# Patient Record
Sex: MALE | Race: OTHER | HISPANIC OR LATINO | Employment: FULL TIME | URBAN - METROPOLITAN AREA
[De-identification: names, ages, dates, MRNs, and addresses within clinical notes are randomized per-mention and may not be internally consistent; named-entity substitution may affect disease eponyms.]

---

## 2022-07-10 ENCOUNTER — APPOINTMENT (EMERGENCY)
Dept: RADIOLOGY | Facility: HOSPITAL | Age: 37
End: 2022-07-10
Payer: COMMERCIAL

## 2022-07-10 ENCOUNTER — HOSPITAL ENCOUNTER (EMERGENCY)
Facility: HOSPITAL | Age: 37
Discharge: HOME | End: 2022-07-11
Attending: EMERGENCY MEDICINE | Admitting: EMERGENCY MEDICINE
Payer: COMMERCIAL

## 2022-07-10 DIAGNOSIS — S52.601A CLOSED FRACTURE OF DISTAL ENDS OF RIGHT RADIUS AND ULNA, INITIAL ENCOUNTER: Primary | ICD-10-CM

## 2022-07-10 DIAGNOSIS — S52.501A CLOSED FRACTURE OF DISTAL ENDS OF RIGHT RADIUS AND ULNA, INITIAL ENCOUNTER: Primary | ICD-10-CM

## 2022-07-10 PROCEDURE — 99285 EMERGENCY DEPT VISIT HI MDM: CPT | Performed by: EMERGENCY MEDICINE

## 2022-07-10 PROCEDURE — 73090 X-RAY EXAM OF FOREARM: CPT

## 2022-07-10 PROCEDURE — 73110 X-RAY EXAM OF WRIST: CPT

## 2022-07-10 PROCEDURE — 99284 EMERGENCY DEPT VISIT MOD MDM: CPT

## 2022-07-10 PROCEDURE — 25605 CLTX DST RDL FX/EPHYS SEP W/: CPT | Performed by: EMERGENCY MEDICINE

## 2022-07-10 RX ORDER — OXYCODONE HYDROCHLORIDE 5 MG/1
5 TABLET ORAL ONCE
Status: COMPLETED | OUTPATIENT
Start: 2022-07-10 | End: 2022-07-10

## 2022-07-10 RX ORDER — LIDOCAINE HYDROCHLORIDE 10 MG/ML
10 INJECTION, SOLUTION EPIDURAL; INFILTRATION; INTRACAUDAL; PERINEURAL ONCE
Status: COMPLETED | OUTPATIENT
Start: 2022-07-10 | End: 2022-07-10

## 2022-07-10 RX ORDER — ACETAMINOPHEN 325 MG/1
975 TABLET ORAL ONCE
Status: COMPLETED | OUTPATIENT
Start: 2022-07-10 | End: 2022-07-10

## 2022-07-10 RX ORDER — ONDANSETRON 4 MG/1
4 TABLET, ORALLY DISINTEGRATING ORAL ONCE
Status: COMPLETED | OUTPATIENT
Start: 2022-07-10 | End: 2022-07-10

## 2022-07-10 RX ADMIN — OXYCODONE HYDROCHLORIDE 5 MG: 5 TABLET ORAL at 22:16

## 2022-07-10 RX ADMIN — LIDOCAINE HYDROCHLORIDE 10 ML: 10 INJECTION, SOLUTION EPIDURAL; INFILTRATION; INTRACAUDAL at 22:56

## 2022-07-10 RX ADMIN — ACETAMINOPHEN 975 MG: 325 TABLET, FILM COATED ORAL at 22:16

## 2022-07-10 RX ADMIN — ONDANSETRON 4 MG: 4 TABLET, ORALLY DISINTEGRATING ORAL at 23:33

## 2022-07-10 NOTE — Clinical Note
Carmen Suarez was seen and treated in our emergency department on 7/10/2022  No work until cleared by Family Doctor/Orthopedics        Diagnosis: R arm fracture    Hindu Prudent  may return to work on return date  He may return on this date: 07/16/2022         If you have any questions or concerns, please don't hesitate to call        Yohan Savage MD    ______________________________           _______________          _______________  Hospital Representative                              Date                                Time

## 2022-07-11 ENCOUNTER — APPOINTMENT (EMERGENCY)
Dept: RADIOLOGY | Facility: HOSPITAL | Age: 37
End: 2022-07-11
Payer: COMMERCIAL

## 2022-07-11 VITALS
WEIGHT: 197.09 LBS | OXYGEN SATURATION: 98 % | DIASTOLIC BLOOD PRESSURE: 81 MMHG | RESPIRATION RATE: 17 BRPM | TEMPERATURE: 99.1 F | HEART RATE: 99 BPM | SYSTOLIC BLOOD PRESSURE: 121 MMHG

## 2022-07-11 PROCEDURE — 73100 X-RAY EXAM OF WRIST: CPT

## 2022-07-11 PROCEDURE — 73110 X-RAY EXAM OF WRIST: CPT

## 2022-07-11 PROCEDURE — 96372 THER/PROPH/DIAG INJ SC/IM: CPT

## 2022-07-11 RX ORDER — MORPHINE SULFATE 4 MG/ML
4 INJECTION, SOLUTION INTRAMUSCULAR; INTRAVENOUS ONCE
Status: COMPLETED | OUTPATIENT
Start: 2022-07-11 | End: 2022-07-11

## 2022-07-11 RX ORDER — OXYCODONE HYDROCHLORIDE 5 MG/1
5 TABLET ORAL EVERY 4 HOURS PRN
Qty: 20 TABLET | Refills: 0 | Status: SHIPPED | OUTPATIENT
Start: 2022-07-11 | End: 2022-07-21

## 2022-07-11 RX ORDER — ONDANSETRON 4 MG/1
4 TABLET, ORALLY DISINTEGRATING ORAL ONCE
Status: COMPLETED | OUTPATIENT
Start: 2022-07-11 | End: 2022-07-11

## 2022-07-11 RX ADMIN — MORPHINE SULFATE 4 MG: 4 INJECTION INTRAVENOUS at 00:50

## 2022-07-11 RX ADMIN — ONDANSETRON 4 MG: 4 TABLET, ORALLY DISINTEGRATING ORAL at 00:49
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
## 2022-07-11 NOTE — ED NOTES
AVS reviewed with pt by provider, pt verbalized understanding of d/c instructions and follow up care  VSS   Pt left ambulatory with steady gait to private vehicle with friend, alert and oriented     Halina Covarrubias RN  07/11/22 7284

## 2022-07-11 NOTE — DISCHARGE INSTRUCTIONS
Please follow up with orthopedics within the next week  Keep your splint dry, do not remove the splint until you see ortho

## 2022-07-11 NOTE — ED NOTES
Patients RUE elevated and ice placed  +3 swelling noted to wrist/lower forearm area  Denies tingling/numbness to hand/digits  +finger movement  GCS 15   Denies thinners     Carmen Mendez RN  07/10/22 4128
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
## 2022-07-11 NOTE — ED NOTES
Patient diaphoretic and complaining of nausea at this time  Emesis x1   Resident made aware     Carmen Mendez RN  07/10/22 9017

## 2022-07-12 ENCOUNTER — TELEPHONE (OUTPATIENT)
Dept: OBGYN CLINIC | Facility: OTHER | Age: 37
End: 2022-07-12

## 2022-07-12 NOTE — TELEPHONE ENCOUNTER
*Interpretir needed     Called patient to get appointmet for hand for severe wrist fracture  They are Self Pay   I did explain Selfpay protocol    Patient said they want to call an insurance company before scheduling       They said they will call back

## 2022-08-08 ENCOUNTER — TELEPHONE (OUTPATIENT)
Dept: OBGYN CLINIC | Facility: HOSPITAL | Age: 37
End: 2022-08-08

## 2022-08-08 NOTE — TELEPHONE ENCOUNTER
Patient called to schedule with hand surgeon for his right hand Closed fracture of distal ends of right radius and ulna    Patient can be reached at [phone]

## 2022-08-09 ENCOUNTER — OFFICE VISIT (OUTPATIENT)
Dept: OBGYN CLINIC | Facility: HOSPITAL | Age: 37
End: 2022-08-09
Payer: COMMERCIAL

## 2022-08-09 ENCOUNTER — HOSPITAL ENCOUNTER (OUTPATIENT)
Dept: RADIOLOGY | Facility: HOSPITAL | Age: 37
Discharge: HOME | End: 2022-08-09
Attending: ORTHOPAEDIC SURGERY
Payer: COMMERCIAL

## 2022-08-09 VITALS — SYSTOLIC BLOOD PRESSURE: 146 MMHG | HEART RATE: 86 BPM | DIASTOLIC BLOOD PRESSURE: 102 MMHG | WEIGHT: 197.09 LBS

## 2022-08-09 DIAGNOSIS — S52.501D CLOSED FRACTURE OF DISTAL ENDS OF RIGHT RADIUS AND ULNA WITH ROUTINE HEALING, SUBSEQUENT ENCOUNTER: Primary | ICD-10-CM

## 2022-08-09 DIAGNOSIS — M25.531 RIGHT WRIST PAIN: ICD-10-CM

## 2022-08-09 DIAGNOSIS — S52.601D CLOSED FRACTURE OF DISTAL ENDS OF RIGHT RADIUS AND ULNA WITH ROUTINE HEALING, SUBSEQUENT ENCOUNTER: Primary | ICD-10-CM

## 2022-08-09 PROBLEM — S52.501A FRACTURE OF RADIUS, DISTAL, WITH ULNA, RIGHT, CLOSED, INITIAL ENCOUNTER: Status: ACTIVE | Noted: 2022-08-09

## 2022-08-09 PROBLEM — S52.601A FRACTURE OF RADIUS, DISTAL, WITH ULNA, RIGHT, CLOSED, INITIAL ENCOUNTER: Status: ACTIVE | Noted: 2022-08-09

## 2022-08-09 PROCEDURE — 73110 X-RAY EXAM OF WRIST: CPT

## 2022-08-09 PROCEDURE — 99202 OFFICE O/P NEW SF 15 MIN: CPT | Performed by: ORTHOPAEDIC SURGERY

## 2022-08-09 NOTE — ASSESSMENT & PLAN NOTE
We are going to get a CT scan of his wrist does determine if there is anything I can do to help make it better at this point since it is already a month too late  If we can then we'll take him to surgery for open reduction internal fixation
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
## 2022-08-15 ENCOUNTER — HOSPITAL ENCOUNTER (OUTPATIENT)
Dept: CT IMAGING | Facility: HOSPITAL | Age: 37
Discharge: HOME | End: 2022-08-15
Payer: COMMERCIAL

## 2022-08-15 DIAGNOSIS — S52.501D CLOSED FRACTURE OF DISTAL ENDS OF RIGHT RADIUS AND ULNA WITH ROUTINE HEALING, SUBSEQUENT ENCOUNTER: ICD-10-CM

## 2022-08-15 DIAGNOSIS — S52.601D CLOSED FRACTURE OF DISTAL ENDS OF RIGHT RADIUS AND ULNA WITH ROUTINE HEALING, SUBSEQUENT ENCOUNTER: ICD-10-CM

## 2022-08-15 DIAGNOSIS — M25.531 RIGHT WRIST PAIN: ICD-10-CM

## 2022-08-15 PROCEDURE — G1004 CDSM NDSC: HCPCS

## 2022-08-15 PROCEDURE — 73200 CT UPPER EXTREMITY W/O DYE: CPT

## 2022-08-23 ENCOUNTER — OFFICE VISIT (OUTPATIENT)
Dept: OBGYN CLINIC | Facility: HOSPITAL | Age: 37
End: 2022-08-23
Payer: COMMERCIAL

## 2022-08-23 VITALS
HEART RATE: 62 BPM | WEIGHT: 197.09 LBS | DIASTOLIC BLOOD PRESSURE: 87 MMHG | BODY MASS INDEX: 32.84 KG/M2 | SYSTOLIC BLOOD PRESSURE: 130 MMHG | HEIGHT: 65 IN

## 2022-08-23 DIAGNOSIS — S52.601D CLOSED FRACTURE OF DISTAL ENDS OF RIGHT RADIUS AND ULNA WITH ROUTINE HEALING, SUBSEQUENT ENCOUNTER: Primary | ICD-10-CM

## 2022-08-23 DIAGNOSIS — S52.501D CLOSED FRACTURE OF DISTAL ENDS OF RIGHT RADIUS AND ULNA WITH ROUTINE HEALING, SUBSEQUENT ENCOUNTER: Primary | ICD-10-CM

## 2022-08-23 PROCEDURE — 99213 OFFICE O/P EST LOW 20 MIN: CPT | Performed by: ORTHOPAEDIC SURGERY
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
## 2022-08-24 RX ORDER — CEFAZOLIN SODIUM 2 G/50ML
2000 SOLUTION INTRAVENOUS ONCE
Status: CANCELLED | OUTPATIENT
Start: 2022-08-24 | End: 2022-08-24

## 2022-08-24 RX ORDER — CHLORHEXIDINE GLUCONATE 0.12 MG/ML
15 RINSE ORAL ONCE
Status: CANCELLED | OUTPATIENT
Start: 2022-08-24 | End: 2022-08-24

## 2022-08-25 ENCOUNTER — ANESTHESIA EVENT (OUTPATIENT)
Dept: PERIOP | Facility: HOSPITAL | Age: 37
End: 2022-08-25
Payer: COMMERCIAL
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
## 2022-08-26 ENCOUNTER — HOSPITAL ENCOUNTER (OUTPATIENT)
Facility: HOSPITAL | Age: 37
Setting detail: OUTPATIENT SURGERY
Discharge: HOME | End: 2022-08-26
Attending: ORTHOPAEDIC SURGERY | Admitting: ORTHOPAEDIC SURGERY
Payer: COMMERCIAL

## 2022-08-26 ENCOUNTER — ANESTHESIA (OUTPATIENT)
Dept: PERIOP | Facility: HOSPITAL | Age: 37
End: 2022-08-26
Payer: COMMERCIAL

## 2022-08-26 ENCOUNTER — APPOINTMENT (OUTPATIENT)
Dept: RADIOLOGY | Facility: HOSPITAL | Age: 37
End: 2022-08-26
Payer: COMMERCIAL

## 2022-08-26 VITALS
BODY MASS INDEX: 32.82 KG/M2 | OXYGEN SATURATION: 98 % | WEIGHT: 197 LBS | DIASTOLIC BLOOD PRESSURE: 65 MMHG | HEART RATE: 80 BPM | SYSTOLIC BLOOD PRESSURE: 109 MMHG | TEMPERATURE: 97.5 F | RESPIRATION RATE: 16 BRPM | HEIGHT: 65 IN

## 2022-08-26 DIAGNOSIS — S52.601A FRACTURE OF RADIUS, DISTAL, WITH ULNA, RIGHT, CLOSED, INITIAL ENCOUNTER: Primary | ICD-10-CM

## 2022-08-26 DIAGNOSIS — S52.501A FRACTURE OF RADIUS, DISTAL, WITH ULNA, RIGHT, CLOSED, INITIAL ENCOUNTER: Primary | ICD-10-CM

## 2022-08-26 PROCEDURE — C1713 ANCHOR/SCREW BN/BN,TIS/BN: HCPCS | Performed by: ORTHOPAEDIC SURGERY

## 2022-08-26 PROCEDURE — 25609 OPTX DST RD XART FX/EP SEP3+: CPT | Performed by: ORTHOPAEDIC SURGERY

## 2022-08-26 PROCEDURE — NC001 PR NO CHARGE

## 2022-08-26 PROCEDURE — 73100 X-RAY EXAM OF WRIST: CPT

## 2022-08-26 DEVICE — 2.4MM VA-LCP 2-CLMN VLR DSTL RADIUS PL 7H HD/2H SHAFT/RIGHT
Type: IMPLANTABLE DEVICE | Site: WRIST | Status: FUNCTIONAL
Brand: VA-LCP

## 2022-08-26 DEVICE — 2.7MM CORTEX SCREW SLF-TPNG WITH T8 STARDRIVE RECESS 14MM: Type: IMPLANTABLE DEVICE | Site: WRIST | Status: FUNCTIONAL

## 2022-08-26 DEVICE — 2.4MM VA LOCKING SCREW STARDRIVE 8MM: Type: IMPLANTABLE DEVICE | Site: WRIST | Status: FUNCTIONAL

## 2022-08-26 DEVICE — 2.4MM VA LOCKING SCREW STARDRIVE 24MM: Type: IMPLANTABLE DEVICE | Site: WRIST | Status: FUNCTIONAL

## 2022-08-26 DEVICE — 2.4MM VA LOCKING SCREW STARDRIVE 22MM: Type: IMPLANTABLE DEVICE | Site: WRIST | Status: FUNCTIONAL

## 2022-08-26 RX ORDER — HYDROMORPHONE HCL IN WATER/PF 6 MG/30 ML
0.2 PATIENT CONTROLLED ANALGESIA SYRINGE INTRAVENOUS
Status: DISCONTINUED | OUTPATIENT
Start: 2022-08-26 | End: 2022-08-26 | Stop reason: HOSPADM

## 2022-08-26 RX ORDER — ONDANSETRON 2 MG/ML
4 INJECTION INTRAMUSCULAR; INTRAVENOUS ONCE AS NEEDED
Status: DISCONTINUED | OUTPATIENT
Start: 2022-08-26 | End: 2022-08-26 | Stop reason: HOSPADM

## 2022-08-26 RX ORDER — SODIUM CHLORIDE, SODIUM LACTATE, POTASSIUM CHLORIDE, CALCIUM CHLORIDE 600; 310; 30; 20 MG/100ML; MG/100ML; MG/100ML; MG/100ML
100 INJECTION, SOLUTION INTRAVENOUS CONTINUOUS
Status: DISCONTINUED | OUTPATIENT
Start: 2022-08-26 | End: 2022-08-26 | Stop reason: HOSPADM

## 2022-08-26 RX ORDER — SODIUM CHLORIDE, SODIUM LACTATE, POTASSIUM CHLORIDE, CALCIUM CHLORIDE 600; 310; 30; 20 MG/100ML; MG/100ML; MG/100ML; MG/100ML
INJECTION, SOLUTION INTRAVENOUS CONTINUOUS PRN
Status: DISCONTINUED | OUTPATIENT
Start: 2022-08-26 | End: 2022-08-26

## 2022-08-26 RX ORDER — FENTANYL CITRATE 50 UG/ML
INJECTION, SOLUTION INTRAMUSCULAR; INTRAVENOUS AS NEEDED
Status: DISCONTINUED | OUTPATIENT
Start: 2022-08-26 | End: 2022-08-26

## 2022-08-26 RX ORDER — CHLORHEXIDINE GLUCONATE 0.12 MG/ML
15 RINSE ORAL ONCE
Status: DISCONTINUED | OUTPATIENT
Start: 2022-08-26 | End: 2022-08-26

## 2022-08-26 RX ORDER — MIDAZOLAM HYDROCHLORIDE 2 MG/2ML
INJECTION, SOLUTION INTRAMUSCULAR; INTRAVENOUS AS NEEDED
Status: DISCONTINUED | OUTPATIENT
Start: 2022-08-26 | End: 2022-08-26

## 2022-08-26 RX ORDER — ROPIVACAINE HYDROCHLORIDE 5 MG/ML
INJECTION, SOLUTION EPIDURAL; INFILTRATION; PERINEURAL
Status: COMPLETED | OUTPATIENT
Start: 2022-08-26 | End: 2022-08-26

## 2022-08-26 RX ORDER — CEFAZOLIN SODIUM 2 G/50ML
2000 SOLUTION INTRAVENOUS ONCE
Status: COMPLETED | OUTPATIENT
Start: 2022-08-26 | End: 2022-08-26

## 2022-08-26 RX ORDER — PROPOFOL 10 MG/ML
INJECTION, EMULSION INTRAVENOUS AS NEEDED
Status: DISCONTINUED | OUTPATIENT
Start: 2022-08-26 | End: 2022-08-26

## 2022-08-26 RX ORDER — OXYCODONE HYDROCHLORIDE 5 MG/1
5 TABLET ORAL EVERY 6 HOURS PRN
Qty: 25 TABLET | Refills: 0 | Status: SHIPPED | OUTPATIENT
Start: 2022-08-26 | End: 2022-09-05

## 2022-08-26 RX ORDER — ONDANSETRON 2 MG/ML
4 INJECTION INTRAMUSCULAR; INTRAVENOUS EVERY 6 HOURS PRN
Status: DISCONTINUED | OUTPATIENT
Start: 2022-08-26 | End: 2022-08-26 | Stop reason: HOSPADM

## 2022-08-26 RX ORDER — FENTANYL CITRATE/PF 50 MCG/ML
25 SYRINGE (ML) INJECTION
Status: DISCONTINUED | OUTPATIENT
Start: 2022-08-26 | End: 2022-08-26 | Stop reason: HOSPADM

## 2022-08-26 RX ORDER — OXYCODONE HYDROCHLORIDE 5 MG/1
5 TABLET ORAL EVERY 4 HOURS PRN
Status: DISCONTINUED | OUTPATIENT
Start: 2022-08-26 | End: 2022-08-26 | Stop reason: HOSPADM

## 2022-08-26 RX ORDER — ACETAMINOPHEN 325 MG/1
650 TABLET ORAL EVERY 6 HOURS PRN
Status: DISCONTINUED | OUTPATIENT
Start: 2022-08-26 | End: 2022-08-26 | Stop reason: HOSPADM

## 2022-08-26 RX ORDER — ONDANSETRON 2 MG/ML
INJECTION INTRAMUSCULAR; INTRAVENOUS AS NEEDED
Status: DISCONTINUED | OUTPATIENT
Start: 2022-08-26 | End: 2022-08-26

## 2022-08-26 RX ORDER — DIPHENHYDRAMINE HCL 25 MG/1
CAPSULE, LIQUID FILLED ORAL
COMMUNITY

## 2022-08-26 RX ORDER — OXYCODONE HYDROCHLORIDE 10 MG/1
10 TABLET ORAL EVERY 4 HOURS PRN
Status: DISCONTINUED | OUTPATIENT
Start: 2022-08-26 | End: 2022-08-26 | Stop reason: HOSPADM

## 2022-08-26 RX ORDER — DEXAMETHASONE SODIUM PHOSPHATE 10 MG/ML
INJECTION, SOLUTION INTRAMUSCULAR; INTRAVENOUS AS NEEDED
Status: DISCONTINUED | OUTPATIENT
Start: 2022-08-26 | End: 2022-08-26

## 2022-08-26 RX ADMIN — CEFAZOLIN SODIUM 2000 MG: 2 SOLUTION INTRAVENOUS at 08:16

## 2022-08-26 RX ADMIN — FENTANYL CITRATE 25 MCG: 50 INJECTION INTRAMUSCULAR; INTRAVENOUS at 10:00

## 2022-08-26 RX ADMIN — ROPIVACAINE HYDROCHLORIDE 30 ML: 5 INJECTION, SOLUTION EPIDURAL; INFILTRATION; PERINEURAL at 07:48

## 2022-08-26 RX ADMIN — FENTANYL CITRATE 50 MCG: 50 INJECTION INTRAMUSCULAR; INTRAVENOUS at 08:31

## 2022-08-26 RX ADMIN — FENTANYL CITRATE 25 MCG: 50 INJECTION INTRAMUSCULAR; INTRAVENOUS at 09:33

## 2022-08-26 RX ADMIN — MIDAZOLAM 2 MG: 1 INJECTION INTRAMUSCULAR; INTRAVENOUS at 07:37

## 2022-08-26 RX ADMIN — ONDANSETRON 4 MG: 2 INJECTION INTRAMUSCULAR; INTRAVENOUS at 09:36

## 2022-08-26 RX ADMIN — SODIUM CHLORIDE, SODIUM LACTATE, POTASSIUM CHLORIDE, AND CALCIUM CHLORIDE: .6; .31; .03; .02 INJECTION, SOLUTION INTRAVENOUS at 07:06

## 2022-08-26 RX ADMIN — DEXAMETHASONE SODIUM PHOSPHATE 10 MG: 10 INJECTION, SOLUTION INTRAMUSCULAR; INTRAVENOUS at 08:28

## 2022-08-26 RX ADMIN — PROPOFOL 200 MG: 10 INJECTION, EMULSION INTRAVENOUS at 08:15

## 2022-08-26 NOTE — ANESTHESIA PROCEDURE NOTES
Peripheral Block    Patient location during procedure: holding area  Start time: 8/26/2022 7:47 AM  Reason for block: at surgeon's request and post-op pain management  Staffing  Performed: CRNA   Anesthesiologist: Jessica Geller MD  Resident/CRNA: Natacha Masterson, CRNA  Preanesthetic Checklist  Completed: patient identified, IV checked, site marked, risks and benefits discussed, surgical consent, monitors and equipment checked, pre-op evaluation and timeout performed  Peripheral Block  Patient position: supine  Prep: ChloraPrep  Patient monitoring: continuous pulse ox and frequent blood pressure checks  Block type: supraclavicular  Laterality: right  Injection technique: single-shot  Procedures: ultrasound guided, Ultrasound guidance required for the procedure to increase accuracy and safety of medication placement and decrease risk of complications    Ultrasound permanent image savedropivacaine (NAROPIN) 0 5 % - Perineural   30 mL - 8/26/2022 7:48:00 AM  Needle  Needle type: Stimuplex   Needle gauge: 22 G  Needle length: 10 cm  Needle localization: ultrasound guidance  Test dose: negative  Assessment  Injection assessment: incremental injection, local visualized surrounding nerve on ultrasound, negative aspiration for heme and no paresthesia on injection  Paresthesia pain: immediately resolved  Heart rate change: no  Slow fractionated injection: yes  Post-procedure:  site cleaned  patient tolerated the procedure well with no immediate complications  Additional Notes  Sedated with meaningful contact

## 2022-08-26 NOTE — H&P
H&P Exam - Orthopedics   Paramjit Flores 40 y o  male MRN: [id number]  Unit/Bed#: OR [place] Encounter: [id number]    Assessment/Plan     Assessment:  Right distal radius fracture  Plan:  · The patient's fracture is significantly comminuted on CT scan and he would benefit from a surgery  After consulting with the hand surgeons, the best approach was determined to be ORIF  Risks and benefits of the procedure were discussed with the patient  All questions were addressed to his satisfaction  Consent for procedure was signed with the patient  · We will take him to the OR for ORIF of the right distal radius today  History of Present Illness   HPI:  Paramjit Flores is a 40 y o  male who presents to the office today for a right distal radius fracture sustained 6 weeks ago on 7/10  He has been treated non-operatively so far  He is here today for a review of his CT scan  He reports continued pain with certain movements of the wrist   He does note occasinal intermittent numbness and tingling over the median nerve distribution  Review of Systems   Constitutional: Negative for chills and fever  HENT: Negative for facial swelling, rhinorrhea and voice change  Eyes: Negative for pain and redness  Respiratory: Negative for cough and shortness of breath  Cardiovascular: Negative for chest pain  Gastrointestinal: Negative for nausea and vomiting  Musculoskeletal: Positive for arthralgias (right wrist pain)  Neurological: Negative for light-headedness and headaches  Historical Information   History reviewed  No pertinent past medical history  Past Surgical History:   Procedure Laterality Date    NO PAST SURGERIES       Social History   Social History     Substance and Sexual Activity   Alcohol Use Never     Social History     Substance and Sexual Activity   Drug Use Never     Social History     Tobacco Use   Smoking Status Never Smoker   Smokeless Tobacco Never Used     Family History: History reviewed  No pertinent family history  Meds/Allergies   PTA meds:   Prior to Admission Medications   Prescriptions Last Dose Informant Patient Reported? Taking? diphenhydrAMINE HCl, Sleep, (Sleep Aid) 25 MG CAPS 8/24/2022  Yes Yes   Sig: Take by mouth      Facility-Administered Medications: None     No Known Allergies    Objective   Vitals: Blood pressure 128/79, pulse 73, temperature 97 5 °F (36 4 °C), temperature source Temporal, resp  rate 16, height 5' 5" (1 651 m), weight 89 4 kg (197 lb), SpO2 96 %  ,Body mass index is 32 78 kg/m²  No intake or output data in the 24 hours ending 08/26/22 0719    No intake/output data recorded  Invasive Devices  Report    Peripheral Intravenous Line  Duration           Peripheral IV 08/26/22 Left Hand <1 day                Physical Exam  Constitutional:       General: He is not in acute distress  Appearance: Normal appearance  He is not ill-appearing, toxic-appearing or diaphoretic  HENT:      Head: Normocephalic and atraumatic  Right Ear: External ear normal       Left Ear: External ear normal       Nose: Nose normal    Eyes:      General:         Right eye: No discharge  Left eye: No discharge  Extraocular Movements: Extraocular movements intact  Conjunctiva/sclera: Conjunctivae normal       Pupils: Pupils are equal, round, and reactive to light  Cardiovascular:      Rate and Rhythm: Normal rate and regular rhythm  Pulmonary:      Effort: Pulmonary effort is normal       Breath sounds: Normal breath sounds  Abdominal:      General: Abdomen is flat  There is no distension  Skin:     General: Skin is warm and dry  Capillary Refill: Capillary refill takes less than 2 seconds  Neurological:      Mental Status: He is alert and oriented to person, place, and time     Psychiatric:         Mood and Affect: Mood normal          Behavior: Behavior normal        Ortho Exam     Right Wrist:     Inspection:  There is no erythema or ecchymosis in the fingers      Range of Motion:  The patient is able to move all fingers without difficulty      Sensation:  SILT over the fingers      Other:  Fingers WWP  Cap refill is less than 2 seconds  Lab Results: I have personally reviewed pertinent lab results  Imaging: I have personally reviewed pertinent films in PACS  Right Wrist CT:  There is an intra-articular distal radius Thompson fracture with significant comminution and articular surface incongruity  EKG, Pathology, and Other Studies: I have personally reviewed pertinent reports  Code Status: Level 1, Full Code  Advance Directive and Living Will: Not on file  Power of : Not on file  POLST: Not on file  Counseling / Coordination of Care  Total floor / unit time spent today 15 minutes  Greater than 50% of total time was spent with the patient and / or family counseling and / or coordination of care  A description of the counseling / coordination of care: Discussing and signing consent for ORIF of the right distal radius

## 2022-08-26 NOTE — ANESTHESIA POSTPROCEDURE EVALUATION
Post-Op Assessment Note    CV Status:  Stable  Pain Score: 0    Pain management: adequate     Mental Status:  Alert and awake   Hydration Status:  Euvolemic   PONV Controlled:  Controlled   Airway Patency:  Patent      Post Op Vitals Reviewed: Yes      Staff: CRNA         No complications documented      BP   116/66   Temp (!) 97 1 °F (36 2 °C) (08/26/22 1012)    Pulse 85 (08/26/22 1012)   Resp 16 (08/26/22 1012)    SpO2   100%

## 2022-08-26 NOTE — RESTORATIVE TECHNICIAN NOTE
Restorative Technician Note      Patient Name: Chiara Campbell     Restorative Tech Visit Date: 8/26/2022  Note Type: Bracing, Initial consult  Patient Position Upon Consult: Supine  Brace Applied: Wrist Sue Ortega (right, XL)  Additional Brace Ordered: No  Education Provided: Yes  Nurse Communication: Nurse aware of consult, application of brace        Unable to don wrist brace due to ACE wrap  Informed the patient the wrist brace is in the box on the [facility] pt to  take it home with him and to follow d/c instructions on when to place it on his right wrist      Please call Mobility Coordinator at ext  [phone] or on [place] text " [facility]-PT-Restorative Tech" role in regards to bracing instruction and/or adjustment      [employer],

## 2022-08-26 NOTE — DISCHARGE INSTRUCTIONS
Discharge Instructions - Orthopedics  Kaia Rascon 40 y o  male MRN: [id number]  Unit/Bed#: HOLDING (IP) 1    Weight Bearing Status:                                           Do not lift with the right hand or arm until follow-up  Avoid strenuous exercise which may make it more likely for you to fall onto your right hand including running and jumping  DVT prophylaxis  None necessary  Pain:  Take over-the-counter Tylenol or ibuprofen as needed for mild to moderate pain  Save the oxycodone for severe pain  Dressing Instructions: On post-op day 3 (8/29) remove dressings currently on the wrist   If there is a yellow strip of xeroform, this can stay  Then recover the wound in 2-3 gauze pads and re-wrap in a fresh ace bandage  Then apply the Velcro wrist splint over these dressings and leave until follow-up  Appt Instructions: If you do not have your appointment, please call the clinic at [phone]  Follow-up should be with Dr Tien Leavitt in 2 weeks  Otherwise followup as scheduled     Contact the office sooner if you experience any increased numbness/tingling in the extremities        Miscellaneous:  None

## 2022-08-26 NOTE — OP NOTE
OPERATIVE REPORT  PATIENT NAME: Acosta Cramer    :  1985  MRN: [id number]  Pt Location:  OR ROOM 09    SURGERY DATE: 2022    Surgeon(s) and Role:     * Purvi Leal MD - Primary     * HAMIDA Dumont-C - [address] Maribel Patrick MD - Assisting    Preop Diagnosis:  Closed fracture of distal ends of right radius and ulna with routine healing, subsequent encounter [S5 501D, S5 601D]    Post-Op Diagnosis Codes:     * Closed fracture of distal ends of right radius and ulna with routine healing, subsequent encounter [S52 501D, S52 601D]    Procedure(s) (LRB):  OPEN REDUCTION W/ INTERNAL FIXATION (ORIF) RADIUS / ULNA (WRIST) (Right)    Specimen(s):  * No specimens in log *    Estimated Blood Loss:   Minimal    Drains:  * No LDAs found *    Anesthesia Type:   General w/ Regional    Operative Indications:  Closed fracture of distal ends of right radius and ulna with routine healing, subsequent encounter [S5 501D, S5 601D]    Operative Findings:  Delayed presentation of highly comminuted intra-articular right distal radial fracture with greater than 3 intra-articular fragments    Complications:   None    Procedure and Technique:  After informed consent had been obtained patient was brought to the operating room and transferred to the operating table in the supine position  General anesthesia was obtained  Tourniquet cuff was placed on the upper aspect of the arm the patient was prepped and draped normal sterile fashion  The arm was elevated and the tourniquet was raised  Using the standard approach over the flexor carpi radialis tendon dissection was taken down through the skin and subcutaneous tissues to the level of the FCR and then between the radial artery and the FCR down to level of the pronator quadratus and then the pronator was elevated off the bone  There was substantial amounts of scarring and hypertrophic callus tissue throughout    This was a real mass and very difficult to get any formal reduction  The volar Thompson's fragments were elevated and extensive dissection was continued to try to get the intercalary segment back op as well as the volar Jean Ohs fragment back down to a good position but everything was so soft so comminuted and so absolutely destroyed was very difficult to get a perfect reduction  At this point try just the best we could to restore as much of the alignment as possible because of the extensive delayed presentation of this fracture the significant complexity of in the 1st place  After multiple open reduction techniques and pinning with 1 25 mm Esme wires this was then stabilized with a Synthes volar distal radial plate with 2 7 mm cortical and 2 4 mm locking screws in buttress fashion trying to get the volar Thompson's pieces pushed back up into is normal alignment as possible  Final fluoroscopic views were obtained  The wounds were then closed with 2-0 Vicryl and 3-0 nylon  Sterile dressings were applied  Patient was then transferred to recovery stable condition having tolerated procedure well     I was present for the entire procedure and I was present for all critical portions of the procedure    Patient Disposition:  PACU       SIGNATURE: Merrick Vogt MD  DATE: August 26, 2022  TIME: 9:39 AM

## 2022-08-26 NOTE — ANESTHESIA PREPROCEDURE EVALUATION
Procedure:  OPEN REDUCTION W/ INTERNAL FIXATION (ORIF) RADIUS / ULNA (WRIST) (Right Wrist)    Relevant Problems   No relevant active problems    Obesity BMI 33    Physical Exam    Airway    Mallampati score: II  TM Distance: >3 FB  Neck ROM: full     Dental   No notable dental hx     Cardiovascular      Pulmonary      Other Findings        Anesthesia Plan  ASA Score- 2     Anesthesia Type- general with ASA Monitors  Additional Monitors:   Airway Plan: LMA  Comment: Supraclavicular block for post op pain  Plan Factors-Exercise tolerance (METS): >4 METS  Chart reviewed  Patient summary reviewed  Patient is not a current smoker  Induction- intravenous  Postoperative Plan-   Planned trial extubation    Informed Consent- Anesthetic plan and risks discussed with patient  I personally reviewed this patient with the CRNA  Discussed and agreed on the Anesthesia Plan with the CRNA  Joey Nunn

## 2022-09-12 ENCOUNTER — TELEPHONE (OUTPATIENT)
Dept: OBGYN CLINIC | Facility: OTHER | Age: 37
End: 2022-09-12

## 2022-09-12 NOTE — TELEPHONE ENCOUNTER
Patient called in , he had surgery on 08-26 and was supposed to have a two week post op appointment , which would've been on 09-09     email sent to [facility] angie Chi    C/b [phone]

## 2022-09-19 ENCOUNTER — TELEPHONE (OUTPATIENT)
Dept: OBGYN CLINIC | Facility: HOSPITAL | Age: 37
End: 2022-09-19

## 2022-09-19 NOTE — TELEPHONE ENCOUNTER
Patient calling to state he cannot make his appt today for p/o  He also states he needs sutures removed and would like a morning apt asap   Please advise    CB # [phone]

## 2022-09-20 ENCOUNTER — OFFICE VISIT (OUTPATIENT)
Dept: OBGYN CLINIC | Facility: HOSPITAL | Age: 37
End: 2022-09-20

## 2022-09-20 ENCOUNTER — HOSPITAL ENCOUNTER (OUTPATIENT)
Dept: RADIOLOGY | Facility: HOSPITAL | Age: 37
Discharge: HOME | End: 2022-09-20
Attending: ORTHOPAEDIC SURGERY
Payer: COMMERCIAL

## 2022-09-20 VITALS
HEART RATE: 85 BPM | HEIGHT: 65 IN | SYSTOLIC BLOOD PRESSURE: 112 MMHG | DIASTOLIC BLOOD PRESSURE: 72 MMHG | WEIGHT: 197 LBS | BODY MASS INDEX: 32.82 KG/M2

## 2022-09-20 DIAGNOSIS — S52.501A FRACTURE OF RADIUS, DISTAL, WITH ULNA, RIGHT, CLOSED, INITIAL ENCOUNTER: Primary | ICD-10-CM

## 2022-09-20 DIAGNOSIS — S52.501D CLOSED FRACTURE OF DISTAL ENDS OF RIGHT RADIUS AND ULNA WITH ROUTINE HEALING, SUBSEQUENT ENCOUNTER: ICD-10-CM

## 2022-09-20 DIAGNOSIS — S52.601A FRACTURE OF RADIUS, DISTAL, WITH ULNA, RIGHT, CLOSED, INITIAL ENCOUNTER: Primary | ICD-10-CM

## 2022-09-20 DIAGNOSIS — S52.601D CLOSED FRACTURE OF DISTAL ENDS OF RIGHT RADIUS AND ULNA WITH ROUTINE HEALING, SUBSEQUENT ENCOUNTER: ICD-10-CM

## 2022-09-20 PROCEDURE — 73110 X-RAY EXAM OF WRIST: CPT

## 2022-09-20 PROCEDURE — 99024 POSTOP FOLLOW-UP VISIT: CPT | Performed by: ORTHOPAEDIC SURGERY

## 2022-09-20 NOTE — PROGRESS NOTES
Assessment/Plan:    Fracture of radius, distal, with ulna, right, closed, initial encounter  Certainly his outcome will be directly impacted by his delayed presentation before even had surgery and now his relative noncompliance afterwards  We are going to get him into active and passive range of motion  He has got a wrist splint  We will take his sutures out today  Diagnoses and all orders for this visit:    Fracture of radius, distal, with ulna, right, closed, initial encounter  -     Ambulatory Referral to Physical Therapy; Future          Subjective:      Patient ID: Sivakumar Stern is a 40 y o  male  This is a 28-year-old fellow who is status postop reduction internal fixation after delayed presentation of his distal radius fracture  His surgery was on 10 July 2022  He now follows up  He is doing okay  He has no complaints  The following portions of the patient's history were reviewed and updated as appropriate: allergies, current medications, past family history, past medical history, past social history, past surgical history, and problem list     Review of Systems      Objective:      /72   Pulse 85   Ht 5' 5" (1 651 m)   Wt 89 4 kg (197 lb)   BMI 32 78 kg/m²          Physical Exam      On physical examination everything looks good  His incisions healing very nicely  He is neurologically intact distally  His alignment looks good  No sign of problems  AP lateral views of the wrist ordered obtained interpreted shows acceptable position of his fracture his fixation

## 2022-09-20 NOTE — ASSESSMENT & PLAN NOTE
Certainly his outcome will be directly impacted by his delayed presentation before even had surgery and now his relative noncompliance afterwards  We are going to get him into active and passive range of motion  He has got a wrist splint  We will take his sutures out today

## 2022-09-23 DIAGNOSIS — S52.501D CLOSED FRACTURE OF DISTAL ENDS OF RIGHT RADIUS AND ULNA WITH ROUTINE HEALING, SUBSEQUENT ENCOUNTER: Primary | ICD-10-CM

## 2022-09-23 DIAGNOSIS — S52.601D CLOSED FRACTURE OF DISTAL ENDS OF RIGHT RADIUS AND ULNA WITH ROUTINE HEALING, SUBSEQUENT ENCOUNTER: Primary | ICD-10-CM

## 2024-12-01 NOTE — ED NOTES
Provider at bedside       Antonio Frye RN  07/10/22 8598 NSTEMI (non-ST elevation myocardial infarction) 1 = Total assistance

## (undated) DEVICE — SUT ETHILON 2-0 FSLX 30 IN 1674H

## (undated) DEVICE — ACE WRAP 4 IN UNSTERILE

## (undated) DEVICE — GAUZE SPONGES,16 PLY: Brand: CURITY

## (undated) DEVICE — INTENDED FOR TISSUE SEPARATION, AND OTHER PROCEDURES THAT REQUIRE A SHARP SURGICAL BLADE TO PUNCTURE OR CUT.: Brand: BARD-PARKER SAFETY BLADES SIZE 15, STERILE

## (undated) DEVICE — CUFF TOURNIQUET 18 X 4 IN QUICK CONNECT DISP 1 BLADDER

## (undated) DEVICE — 1.25MM KIRSCHNER WIRE W/TROCAR POINT 150MM
Type: IMPLANTABLE DEVICE | Site: WRIST | Status: NON-FUNCTIONAL
Removed: 2022-08-26

## (undated) DEVICE — 2.0MM DRILL BIT/QC/100MM

## (undated) DEVICE — GLOVE SRG BIOGEL 9

## (undated) DEVICE — PADDING CAST 4 IN  COTTON STRL

## (undated) DEVICE — 1.6MM KIRSCHNER WIRE W/TROCAR POINT 150MM
Type: IMPLANTABLE DEVICE | Site: WRIST | Status: NON-FUNCTIONAL
Removed: 2022-08-26

## (undated) DEVICE — U-DRAPE: Brand: CONVERTORS

## (undated) DEVICE — PLUMEPEN PRO 10FT

## (undated) DEVICE — 2.4MM CORTEX SCREW SLF-TPNG WITH T8 STARDRIVE RECESS 30MM: Type: IMPLANTABLE DEVICE | Site: WRIST | Status: NON-FUNCTIONAL

## (undated) DEVICE — 1.8MM DRILL BIT WITH DEPTH MARK/QC/110MM

## (undated) DEVICE — SUT VICRYL PLUS 2-0 CTB-1 27 IN VCPB259H

## (undated) DEVICE — GLOVE INDICATOR PI UNDERGLOVE SZ 9 BLUE

## (undated) DEVICE — DISPOSABLE EQUIPMENT COVER: Brand: SMALL TOWEL DRAPE

## (undated) DEVICE — STERILE BETHLEHEM PLASTIC HAND: Brand: CARDINAL HEALTH

## (undated) DEVICE — PAD GROUNDING ADULT

## (undated) DEVICE — 2.4MM CORTEX SCREW SLF-TPNG WITH T8 STARDRIVE RECESS 24MM: Type: IMPLANTABLE DEVICE | Site: WRIST | Status: NON-FUNCTIONAL

## (undated) DEVICE — DRAPE C-ARM X-RAY

## (undated) DEVICE — OCCLUSIVE GAUZE STRIP,3% BISMUTH TRIBROMOPHENATE IN PETROLATUM BLEND: Brand: XEROFORM